# Patient Record
Sex: MALE | HISPANIC OR LATINO | Employment: FULL TIME | URBAN - METROPOLITAN AREA
[De-identification: names, ages, dates, MRNs, and addresses within clinical notes are randomized per-mention and may not be internally consistent; named-entity substitution may affect disease eponyms.]

---

## 2017-08-09 ENCOUNTER — HOSPITAL ENCOUNTER (OUTPATIENT)
Dept: RADIOLOGY | Facility: MEDICAL CENTER | Age: 27
End: 2017-08-09
Attending: FAMILY MEDICINE
Payer: COMMERCIAL

## 2017-08-09 ENCOUNTER — OFFICE VISIT (OUTPATIENT)
Dept: URGENT CARE | Facility: PHYSICIAN GROUP | Age: 27
End: 2017-08-09
Payer: COMMERCIAL

## 2017-08-09 VITALS
OXYGEN SATURATION: 97 % | BODY MASS INDEX: 27.92 KG/M2 | WEIGHT: 195 LBS | HEIGHT: 70 IN | TEMPERATURE: 99.1 F | SYSTOLIC BLOOD PRESSURE: 130 MMHG | HEART RATE: 58 BPM | RESPIRATION RATE: 14 BRPM | DIASTOLIC BLOOD PRESSURE: 72 MMHG

## 2017-08-09 DIAGNOSIS — L72.9 CYST OF SKIN: ICD-10-CM

## 2017-08-09 DIAGNOSIS — K40.90 NON-RECURRENT UNILATERAL INGUINAL HERNIA WITHOUT OBSTRUCTION OR GANGRENE: ICD-10-CM

## 2017-08-09 PROCEDURE — 99203 OFFICE O/P NEW LOW 30 MIN: CPT | Performed by: FAMILY MEDICINE

## 2017-08-09 PROCEDURE — 76857 US EXAM PELVIC LIMITED: CPT

## 2017-08-09 NOTE — PROGRESS NOTES
"Subjective:     Chief Complaint   Patient presents with   • Inguinal Hernia     poss hernia x2wks                 Abdominal Pain  This is a new problem. The current episode started 2 wks ago. The onset quality is sudden. The problem occurs constantly. The pain is unchanged. The pain is located in the rt groin region. The pain is mild. The quality of the pain is described as aching. The pain does not radiate. Associated symptoms include : none. Pertinent negatives include no belching, constipation, diarrhea, dysuria, fever, hematochezia, hematuria, nausea or sore throat. Nothing relieves the symptoms. Past treatments include nothing.     Social History   Substance Use Topics   • Smoking status: Former Smoker   • Smokeless tobacco: Not on file   • Alcohol Use: Not on file           No current outpatient prescriptions on file prior to visit.     No current facility-administered medications on file prior to visit.       No family history on file.      Not on File      Review of Systems   Constitutional: Negative for fever.   HENT: Negative for sore throat.    Gastrointestinal: Positive for abdominal pain. Negative for nausea, diarrhea, constipation and hematochezia.   Genitourinary: Negative for dysuria and hematuria.   Neurological: denies dizziness, confusion, disorientation.   No extremity weakness or numbness  All other systems reviewed and are negative.         Objective:     Blood pressure 130/72, pulse 58, temperature 37.3 °C (99.1 °F), resp. rate 14, height 1.778 m (5' 10\"), weight 88.451 kg (195 lb), SpO2 97 %.      Physical Exam   Constitutional: pt appears well-developed. No distress.   HENT:   Nose: No nasal discharge.   Mouth/Throat: Mucous membranes are moist. Oropharynx is clear.   Eyes: Conjunctivae and EOM are normal. Pupils are equal, round, and reactive to light. Right eye exhibits no discharge. Left eye exhibits no discharge.   Neck: Neck supple.   Cardiovascular: Normal rate, regular rhythm, S1 " normal and S2 normal.    Pulmonary/Chest: Effort normal and breath sounds normal. There is normal air entry. No respiratory distress.   Abdominal: Soft.  Nontender.   There is a 2-3cm firm, subcutaneous nodule in rt groin area, just superior to rt inguinal canal .   There is no overlying erythema, no tenderness.  . bowel sounds are present.   No liver or spleen enlargement .  No rebound and no guarding.   There is no pain over McBurney's point  Lymphadenopathy:     Pt has no  adenopathy.   Neurological: pt is alert and orientated x3 . No cranial nerve deficit.   Skin: Skin is warm and moist. No petechiae and no rash noted.   not diaphoretic. No jaundice.   Nursing note and vitals reviewed.         Narrative        8/9/2017 1:43 PM    HISTORY/REASON FOR EXAM:  Palpable lump in right groin      TECHNIQUE/EXAM DESCRIPTION AND NUMBER OF VIEWS:  Ultrasound of right inguinal and groin region at site of palpable abnormality    COMPARISON: None    FINDINGS:  Anechoic oval-shaped structure with long axis parallel to the transducer is identified in the right groin area at site of palpable abnormality measuring 3.4 x 2.0 x 3.8 cm. No arterial flow or vascular flow is noted within the lesion. No solid component   is identified. No adenopathy or solid mass is noted in this area.         Impression        1.  Cystic structure is identified in the soft tissues at site of palpable abnormality in the right groin area. Differential diagnosis would include old hematoma or seroma or lymphocele. Infected fluid collection is also in the differential diagnosis.   Clinical correlation is recommended.    2.  No solid mass or adenopathy is identified.         Reading Provider Reading Date     Jaylen Peoples M.D. Aug 9, 2017         Signing Provider Signing Date Signing          Assessment/Plan:           1. Cyst of skin    U/s reviewed - there is no hernia.     Will refer to gen surgery for further diagnosis and treatment.

## 2017-08-09 NOTE — MR AVS SNAPSHOT
"        Maria Washingtonno   2017 12:10 PM   Office Visit   MRN: [id number]    Department:  [facility]   Dept Phone:  [phone]    Description:  Male : 1990   Provider:  Aman Rosa M.D.           Reason for Visit     Inguinal Hernia poss hernia x2wks      Allergies as of 2017     Not on File      You were diagnosed with     Non-recurrent unilateral inguinal hernia without obstruction or gangrene   [[id number]]         Vital Signs     Blood Pressure Pulse Temperature Respirations Height Weight    130/72 mmHg 58 37.3 °C (99.1 °F) 14 1.778 m (5' 10\") 88.451 kg (195 lb)    Body Mass Index Oxygen Saturation Smoking Status             27.98 kg/m2 97% Former Smoker         Basic Information     Date Of Birth Sex Race Ethnicity Preferred Language    1990 Male Unable to Obtain  Origin (Filipino,South Korean,Peruvian,Armenian, etc) English      Your appointments     Aug 11, 2017  4:40 PM   New Patient with Davis Brooks PA-C   [facility]    [address]  [address]  [place] [address]   [phone]           Please bring Photo ID, Insurance Cards, All Medication Bottles and copies of any legal documents (such as Living Will, Power of ) If speaking a language besides English please bring an adult . Please arrive 30 minutes prior for check in and registration. You will be receiving a confirmation call a few days before your appointment from our automated call confirmation system.              Health Maintenance        Date Due Completion Dates    IMM HEP B VACCINE (1 of 3 - Primary Series) 1991 ---    IMM HEP A VACCINE (1 of 2 - Standard Series) 1992 ---    IMM HPV VACCINE (1 of 3 - Male 3 Dose Series) 2002 ---    IMM VARICELLA (CHICKENPOX) VACCINE (1 of 2 - 2 Dose Adolescent Series) 2004 ---    IMM DTaP/Tdap/Td Vaccine (1 - Tdap) 2010 ---    IMM INFLUENZA (1) 2017 ---            Current " Immunizations     No immunizations on file.      Below and/or attached are the medications your provider expects you to take. Review all of your home medications and newly ordered medications with your provider and/or pharmacist. Follow medication instructions as directed by your provider and/or pharmacist. Please keep your medication list with you and share with your provider. Update the information when medications are discontinued, doses are changed, or new medications (including over-the-counter products) are added; and carry medication information at all times in the event of emergency situations     Allergies:  No Known Allergies          Medications  Valid as of: August 09, 2017 -  2:03 PM    Generic Name Brand Name Tablet Size Instructions for use    .                 Medicines prescribed today were sent to:     None      Medication refill instructions:       If your prescription bottle indicates you have medication refills left, it is not necessary to call your provider’s office. Please contact your pharmacy and they will refill your medication.    If your prescription bottle indicates you do not have any refills left, you may request refills at any time through one of the following ways: The online [email or web address] system (except Urgent Care), by calling your provider’s office, or by asking your pharmacy to contact your provider’s office with a refill request. Medication refills are processed only during regular business hours and may not be available until the next business day. Your provider may request additional information or to have a follow-up visit with you prior to refilling your medication.   *Please Note: Medication refills are assigned a new Rx number when refilled electronically. Your pharmacy may indicate that no refills were authorized even though a new prescription for the same medication is available at the pharmacy. Please request the medicine by name with the pharmacy before contacting your  provider for a refill.        Your To Do List     Future Labs/Procedures Complete By Expires    US-INGUINAL HERNIA  As directed 8/9/2018         LifeBio Access Code: [email or web address]  Expires: 9/8/2017  2:03 PM    LifeBio  A secure, online tool to manage your health information     [employer]’s LifeBio® is a secure, online tool that connects you to your personalized health information from the privacy of your home -- day or night - making it very easy for you to manage your healthcare. Once the activation process is completed, you can even access your medical information using the LifeBio nani, which is available for free in the Apple Nani store or Google Play store.     LifeBio provides the following levels of access (as shown below):   My Chart Features   [facility] Doctor [facility]  Urgent  Care Non-[facility]  Primary Care  Doctor   Email your healthcare team securely and privately 24/7 X X X    Manage appointments: schedule your next appointment; view details of past/upcoming appointments X      Request prescription refills. X      View recent personal medical records, including lab and immunizations X X X X   View health record, including health history, allergies, medications X X X X   Read reports about your outpatient visits, procedures, consult and ER notes X X X X   See your discharge summary, which is a recap of your hospital and/or ER visit that includes your diagnosis, lab results, and care plan. X X       How to register for LifeBio:  1. Go to  [email or web address].  2. Click on the Sign Up Now box, which takes you to the New Member Sign Up page. You will need to provide the following information:  a. Enter your LifeBio Access Code exactly as it appears at the top of this page. (You will not need to use this code after you’ve completed the sign-up process. If you do not sign up before the expiration date, you must request a new code.)   b. Enter your date of birth.    c. Enter your home email address.   d. Click Submit, and follow the next screen’s instructions.  3. Create a [employer]KLEVERt ID. This will be your ROKT login ID and cannot be changed, so think of one that is secure and easy to remember.  4. Create a [employer]KLEVERt password. You can change your password at any time.  5. Enter your Password Reset Question and Answer. This can be used at a later time if you forget your password.   6. Enter your e-mail address. This allows you to receive e-mail notifications when new information is available in ROKT.  7. Click Sign Up. You can now view your health information.    For assistance activating your ROKT account, call [phone]

## 2017-08-10 ENCOUNTER — TELEPHONE (OUTPATIENT)
Dept: MEDICAL GROUP | Facility: MEDICAL CENTER | Age: 27
End: 2017-08-10

## 2017-08-10 NOTE — TELEPHONE ENCOUNTER
NEW PATIENT VISIT PRE-VISIT PLANNING    1.  [employer] Patient is checked in Patient Demographics? NO    2.  Immunizations were updated in Epic using WebIZ?: No WebIZ record         3.  Is this appointment scheduled as a Hospital Follow-Up? No    4.  Patient is due for the following Health Maintenance Topics:   Health Maintenance Due   Topic Date Due   • IMM HEP B VACCINE (1 of 3 - Primary Series) 1991   • IMM HEP A VACCINE (1 of 2 - Standard Series) 11/21/1992   • IMM HPV VACCINE (1 of 3 - Male 3 Dose Series) 11/21/2002   • IMM VARICELLA (CHICKENPOX) VACCINE (1 of 2 - 2 Dose Adolescent Series) 11/21/2004   • IMM DTaP/Tdap/Td Vaccine (1 - Tdap) 11/21/2010           5.  Reviewed/Updated the following with patient:       •   Preferred Pharmacy? NO       •   Preferred Lab? NO       •   Medications? NO       •   Social History? NO       •   Family History? NO    6.  Updated Care Team?       •   DME Company (gait device, O2, CPAP, etc.) NO       •   Other Specialists (eye doctor, derm, GYN, cardiology, endo, etc): NO    7.  Patient was informed to arrive 15 min prior to their scheduled appointment and bring in their medication bottles.

## 2018-02-16 ENCOUNTER — NON-PROVIDER VISIT (OUTPATIENT)
Dept: URGENT CARE | Facility: CLINIC | Age: 28
End: 2018-02-16

## 2018-02-16 DIAGNOSIS — Z02.1 PRE-EMPLOYMENT DRUG SCREENING: ICD-10-CM

## 2018-02-16 LAB
AMP AMPHETAMINE: NORMAL
COC COCAINE: NORMAL
INT CON NEG: NORMAL
INT CON POS: NORMAL
MET METHAMPHETAMINES: NORMAL
OPI OPIATES: NORMAL
PCP PHENCYCLIDINE: NORMAL
POC DRUG COMMENT 753798-OCCUPATIONAL HEALTH: NORMAL
THC: NORMAL

## 2018-02-16 PROCEDURE — 80305 DRUG TEST PRSMV DIR OPT OBS: CPT | Performed by: NURSE PRACTITIONER
